# Patient Record
Sex: MALE | Race: WHITE | Employment: FULL TIME | URBAN - METROPOLITAN AREA
[De-identification: names, ages, dates, MRNs, and addresses within clinical notes are randomized per-mention and may not be internally consistent; named-entity substitution may affect disease eponyms.]

---

## 2021-06-07 ENCOUNTER — HOSPITAL ENCOUNTER (EMERGENCY)
Age: 51
Discharge: HOME | End: 2021-06-07
Attending: EMERGENCY MEDICINE

## 2021-06-07 ENCOUNTER — APPOINTMENT (OUTPATIENT)
Dept: GENERAL RADIOLOGY | Age: 51
End: 2021-06-07

## 2021-06-07 VITALS
RESPIRATION RATE: 18 BRPM | HEART RATE: 76 BPM | SYSTOLIC BLOOD PRESSURE: 166 MMHG | OXYGEN SATURATION: 98 % | DIASTOLIC BLOOD PRESSURE: 80 MMHG | BODY MASS INDEX: 25.73 KG/M2 | WEIGHT: 190 LBS | TEMPERATURE: 98.5 F | HEIGHT: 72 IN
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
## 2021-06-07 DIAGNOSIS — S69.92XA INJURY OF FINGER OF LEFT HAND BY NAIL GUN, INITIAL ENCOUNTER: Primary | ICD-10-CM

## 2021-06-07 DIAGNOSIS — S62.650A CLOSED NONDISPLACED FRACTURE OF MIDDLE PHALANX OF RIGHT INDEX FINGER, INITIAL ENCOUNTER: ICD-10-CM

## 2021-06-07 PROCEDURE — 99283 EMERGENCY DEPT VISIT LOW MDM: CPT

## 2021-06-07 PROCEDURE — 90471 IMMUNIZATION ADMIN: CPT | Performed by: EMERGENCY MEDICINE

## 2021-06-07 PROCEDURE — 6370000000 HC RX 637 (ALT 250 FOR IP): Performed by: EMERGENCY MEDICINE

## 2021-06-07 PROCEDURE — 10120 INC&RMVL FB SUBQ TISS SMPL: CPT

## 2021-06-07 PROCEDURE — 6360000002 HC RX W HCPCS: Performed by: EMERGENCY MEDICINE

## 2021-06-07 PROCEDURE — 73130 X-RAY EXAM OF HAND: CPT

## 2021-06-07 PROCEDURE — 64450 NJX AA&/STRD OTHER PN/BRANCH: CPT

## 2021-06-07 PROCEDURE — 90715 TDAP VACCINE 7 YRS/> IM: CPT | Performed by: EMERGENCY MEDICINE

## 2021-06-07 RX ORDER — FENTANYL CITRATE 50 UG/ML
75 INJECTION, SOLUTION INTRAMUSCULAR; INTRAVENOUS ONCE
Status: DISCONTINUED | OUTPATIENT
Start: 2021-06-07 | End: 2021-06-07 | Stop reason: HOSPADM

## 2021-06-07 RX ORDER — HYDROCODONE BITARTRATE AND ACETAMINOPHEN 5; 325 MG/1; MG/1
1 TABLET ORAL EVERY 6 HOURS PRN
Qty: 10 TABLET | Refills: 0 | Status: SHIPPED | OUTPATIENT
Start: 2021-06-07 | End: 2021-06-10

## 2021-06-07 RX ORDER — AMOXICILLIN AND CLAVULANATE POTASSIUM 875; 125 MG/1; MG/1
1 TABLET, FILM COATED ORAL 2 TIMES DAILY
Qty: 20 TABLET | Refills: 0 | Status: SHIPPED | OUTPATIENT
Start: 2021-06-07 | End: 2021-06-17

## 2021-06-07 RX ORDER — HYDROCODONE BITARTRATE AND ACETAMINOPHEN 5; 325 MG/1; MG/1
1 TABLET ORAL ONCE
Status: COMPLETED | OUTPATIENT
Start: 2021-06-07 | End: 2021-06-07

## 2021-06-07 RX ORDER — DIAPER,BRIEF,INFANT-TODD,DISP
EACH MISCELLANEOUS ONCE
Status: COMPLETED | OUTPATIENT
Start: 2021-06-07 | End: 2021-06-07

## 2021-06-07 RX ADMIN — Medication: at 13:16

## 2021-06-07 RX ADMIN — HYDROCODONE BITARTRATE AND ACETAMINOPHEN 1 TABLET: 5; 325 TABLET ORAL at 11:06

## 2021-06-07 RX ADMIN — TETANUS TOXOID, REDUCED DIPHTHERIA TOXOID AND ACELLULAR PERTUSSIS VACCINE, ADSORBED 0.5 ML: 5; 2.5; 8; 8; 2.5 SUSPENSION INTRAMUSCULAR at 10:54

## 2021-06-07 ASSESSMENT — PAIN SCALES - GENERAL
PAINLEVEL_OUTOF10: 8
PAINLEVEL_OUTOF10: 8

## 2021-06-07 ASSESSMENT — PAIN DESCRIPTION - FREQUENCY: FREQUENCY: CONTINUOUS

## 2021-06-07 ASSESSMENT — PAIN DESCRIPTION - PROGRESSION: CLINICAL_PROGRESSION: GRADUALLY WORSENING

## 2021-06-07 ASSESSMENT — PAIN DESCRIPTION - ORIENTATION: ORIENTATION: LEFT

## 2021-06-07 ASSESSMENT — PAIN DESCRIPTION - ONSET: ONSET: SUDDEN

## 2021-06-07 ASSESSMENT — PAIN DESCRIPTION - PAIN TYPE: TYPE: ACUTE PAIN

## 2021-06-07 ASSESSMENT — PAIN DESCRIPTION - LOCATION: LOCATION: OTHER (COMMENT)

## 2021-06-07 NOTE — ED PROVIDER NOTES
This is a 54-year male with a past medical history of diabetes mellitus who presents to the ED for evaluation of a finger injury. Patient states that about 20 minutes prior to arrival he was driving home had a nail gun went to check it and shot his finger of his middle digit on his left hand with a nail gun. Patient states he developed immediate pain and some bleeding to the area. Patient denies any other injury he is unsure whether his tetanus is up-to-date. Patient has no fevers or chills. Patient is right-handed. The history is provided by the patient. No  was used. Review of Systems   Constitutional: Negative for fever. Skin: Positive for wound. Allergic/Immunologic: Negative for immunocompromised state. Hematological: Does not bruise/bleed easily. All other systems reviewed and are negative. Physical Exam  Vitals and nursing note reviewed. Constitutional:       General: He is not in acute distress. Appearance: He is well-developed. He is not ill-appearing. HENT:      Head: Normocephalic and atraumatic. Mouth/Throat:      Mouth: Mucous membranes are moist.   Eyes:      Extraocular Movements: Extraocular movements intact. Neck:      Vascular: No JVD. Cardiovascular:      Rate and Rhythm: Normal rate and regular rhythm. Heart sounds: No murmur heard. Pulmonary:      Effort: Pulmonary effort is normal.   Abdominal:      General: There is no distension. Palpations: Abdomen is soft. Tenderness: There is no abdominal tenderness. There is no guarding or rebound. Hernia: No hernia is present. Musculoskeletal:      Cervical back: Normal range of motion and neck supple. Right lower leg: No edema. Left lower leg: No edema. Skin:     General: Skin is warm and dry. Capillary Refill: Capillary refill takes less than 2 seconds.       Comments: Nail through middle aspect of third phalanx entirely through with minimal Bianca Moore DO             ED Course as of Jun 07 2112 Mon Jun 07, 2021   1229 Repaging orthopedics    [BP]      ED Course User Index  [BP] Bianca Moore DO       --------------------------------------------- PAST HISTORY ---------------------------------------------  Past Medical History:  has a past medical history of Chronic back pain, Diabetes mellitus ([facility] [address] and [facility] Past Surgical History:  has a past surgical history that includes Chest surgery (2007); Chest surgery; and fracture surgery. Social History:  reports that he has been smoking cigarettes. He has been smoking about 1.00 pack per day. He has never used smokeless tobacco. He reports current alcohol use. He reports current drug use. Drug: Marijuana. Family History: family history is not on file. The patients home medications have been reviewed. Allergies: Opium    -------------------------------------------------- RESULTS -------------------------------------------------  Labs:  No results found for this visit on 06/07/21. Radiology:  XR HAND LEFT (MIN 3 VIEWS)   Final Result   Metallic nail imbedded in the 3rd middle phalanx with associated fracture   deformity.             ------------------------- NURSING NOTES AND VITALS REVIEWED ---------------------------  Date / Time Roomed:  6/7/2021 10:37 AM  ED Bed Assignment:  02/02    The nursing notes within the ED encounter and vital signs as below have been reviewed. BP (!) 166/80   Pulse 76   Temp 98.5 °F (36.9 °C) (Temporal)   Resp 18   Ht 6' (1.829 m)   Wt 190 lb (86.2 kg)   SpO2 98%   BMI 25.77 kg/m²   Oxygen Saturation Interpretation: Normal      ------------------------------------------ PROGRESS NOTES ------------------------------------------  1:55 PM EDT  I have spoken with the patient and discussed todays results, in addition to providing specific details for the plan of care and counseling regarding the diagnosis and prognosis.   Their questions are answered at this time and they are agreeable with the plan. I discussed at length with them reasons for immediate return here for re evaluation. They will followup with orthopedics. --------------------------------- ADDITIONAL PROVIDER NOTES ---------------------------------  At this time the patient is without objective evidence of an acute process requiring hospitalization or inpatient management. They have remained hemodynamically stable throughout their entire ED visit and are stable for discharge with outpatient follow-up. The plan has been discussed in detail and they are aware of the specific conditions for emergent return, as well as the importance of follow-up. Discharge Medication List as of 6/7/2021  1:06 PM      START taking these medications    Details   amoxicillin-clavulanate (AUGMENTIN) 875-125 MG per tablet Take 1 tablet by mouth 2 times daily for 10 days, Disp-20 tablet, R-0Print      HYDROcodone-acetaminophen (NORCO) 5-325 MG per tablet Take 1 tablet by mouth every 6 hours as needed for Pain for up to 3 days. Intended supply: 3 days. Take lowest dose possible to manage pain, Disp-10 tablet, R-0Print             Diagnosis:  1. Injury of finger of left hand by nail gun, initial encounter    2. Closed nondisplaced fracture of middle phalanx of right index finger, initial encounter        Disposition:  Patient's disposition: Discharge to home  Patient's condition is stable.      Kareen Hou DO  06/07/21 1174

## 2022-10-12 ENCOUNTER — APPOINTMENT (OUTPATIENT)
Dept: GENERAL RADIOLOGY | Age: 52
End: 2022-10-12

## 2022-10-12 ENCOUNTER — HOSPITAL ENCOUNTER (EMERGENCY)
Age: 52
Discharge: HOME | End: 2022-10-12

## 2022-10-12 VITALS
WEIGHT: 180 LBS | BODY MASS INDEX: 24.38 KG/M2 | HEIGHT: 72 IN | HEART RATE: 93 BPM | SYSTOLIC BLOOD PRESSURE: 126 MMHG | OXYGEN SATURATION: 97 % | TEMPERATURE: 98 F | DIASTOLIC BLOOD PRESSURE: 72 MMHG | RESPIRATION RATE: 16 BRPM

## 2022-10-12 DIAGNOSIS — S93.401A SPRAIN OF RIGHT ANKLE, UNSPECIFIED LIGAMENT, INITIAL ENCOUNTER: ICD-10-CM

## 2022-10-12 DIAGNOSIS — M77.9 BONE SPUR: Primary | ICD-10-CM

## 2022-10-12 DIAGNOSIS — M72.2 PLANTAR FASCIITIS: ICD-10-CM

## 2022-10-12 PROCEDURE — 73610 X-RAY EXAM OF ANKLE: CPT

## 2022-10-12 PROCEDURE — 99283 EMERGENCY DEPT VISIT LOW MDM: CPT

## 2022-10-12 RX ORDER — IBUPROFEN 600 MG/1
600 TABLET ORAL 3 TIMES DAILY PRN
Qty: 30 TABLET | Refills: 0 | Status: SHIPPED | OUTPATIENT
Start: 2022-10-12

## 2022-10-12 ASSESSMENT — PAIN - FUNCTIONAL ASSESSMENT
PAIN_FUNCTIONAL_ASSESSMENT: ACTIVITIES ARE NOT PREVENTED
PAIN_FUNCTIONAL_ASSESSMENT: 0-10
PAIN_FUNCTIONAL_ASSESSMENT: 0-10

## 2022-10-12 ASSESSMENT — PAIN DESCRIPTION - DESCRIPTORS
DESCRIPTORS: ACHING
DESCRIPTORS: SHARP

## 2022-10-12 ASSESSMENT — PAIN DESCRIPTION - LOCATION
LOCATION: ANKLE
LOCATION: ANKLE

## 2022-10-12 ASSESSMENT — PAIN DESCRIPTION - ORIENTATION
ORIENTATION: RIGHT
ORIENTATION: RIGHT

## 2022-10-12 ASSESSMENT — PAIN DESCRIPTION - FREQUENCY
FREQUENCY: CONTINUOUS
FREQUENCY: CONTINUOUS

## 2022-10-12 ASSESSMENT — PAIN DESCRIPTION - PAIN TYPE: TYPE: ACUTE PAIN

## 2022-10-12 ASSESSMENT — PAIN DESCRIPTION - ONSET
ONSET: ON-GOING
ONSET: SUDDEN

## 2022-10-12 ASSESSMENT — PAIN SCALES - GENERAL
PAINLEVEL_OUTOF10: 8
PAINLEVEL_OUTOF10: 6

## 2022-10-12 NOTE — Clinical Note
Kasey Tai was seen and treated in our emergency department on 10/12/2022. He may return to work on 10/14/2022. If you have any questions or concerns, please don't hesitate to call.       Sachin Lau, APRN - CNP

## 2022-10-12 NOTE — ED NOTES
Department of Emergency Medicine  FIRST PROVIDER TRIAGE NOTE             Independent MLP           10/12/22  6:47 PM EDT    Date of Encounter: 10/12/22   MRN: [id number]      HPI: Maya Watson is a 50 y.o. male who presents to the ED for Ankle Pain (Right ankle pain. Thinking arthritis flare up. /)       ROS: Negative for cp or sob. PE: Gen Appearance/Constitutional: alert  HEENT: NC/NT. PERRLA,  Airway patent. Initial Plan of Care: All treatment areas with department are currently occupied. Plan to order/Initiate the following while awaiting opening in ED: imaging studies.   Initiate Treatment-Testing, Proceed toTreatment Area When Bed Available for ED Attending/MLP to Continue Care    Electronically signed by GINETTE Jenkins CNP   DD: 10/12/22      GINETTE Snider CNP  10/12/22 [id number]

## 2022-10-12 NOTE — Clinical Note
Ben Coles was seen and treated in our emergency department on 10/12/2022. He may return to work on 10/14/2022. If you have any questions or concerns, please don't hesitate to call.       Dayan Hadley, GINETTE - CNP

## 2022-10-12 NOTE — Clinical Note
Natalya Santizo was seen and treated in our emergency department on 10/12/2022. He may return to work on 10/14/2022. If you have any questions or concerns, please don't hesitate to call.       Brad Shirleyelor, APRN - CNP

## 2022-10-13 NOTE — ED PROVIDER NOTES
[address]  Department of Emergency Medicine   ED  Encounter Note  Admit Date/RoomTime: 10/12/2022  7:59 PM  ED Room:     NAME: Sandy Graham  : 1970  MRN: [id number]     Chief Complaint:  Ankle Pain (Right ankle pain. Thinking arthritis flare up. /)    History of Present Illness         Sandy Graham is a 50 y.o. old male presenting to the emergency department by private vehicle, for traumatic Right ankle pain which occured 18  year(s) prior to arrival.  The complaint is due to a remote accident where he shot his right ankle with a nail gun. Patient states that he was seen and evaluated for that injury 18 years ago but states he has persistent and recurrent pain in the ankle and the foot for the last 18 years. Patient denies any new injury or trauma at this time he also states that he has some tenderness when he steps out of bed first thing in the morning to the plantar surface of his foot. 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 He does wear steel toed shoes. Patient denies any numbness tingling or weakness. Patient denies any loss of sensation. He denies any new injury or trauma to the bilateral feet or ankles. .  Since onset the symptoms have been intermittent and waxing and waning with ability to bear weight, but with some pain. Patient has no prior history of pain/injury with regards to today's visit. His pain is aggraveated by certain movements or pressure on or palpation of painful area and relieved by rest of injured area. He denies any head injury, headache, loss of consciousness, confusion, dizziness, numbness, or weakness. Patient states that he needs a work note as he left work today. Tetanus Status: up to date. ROS   Pertinent positives and negatives are stated within HPI, all other systems reviewed and are negative.     Past Medical History:  has a past medical history of Chronic back pain, Diabetes mellitus ([facility] [address] and Seizures ([facility] [address] Surgical History:  has a past surgical history that includes Chest surgery (2007); Chest surgery; and fracture surgery. Social History:  reports that he has been smoking cigarettes. He has been smoking an average of 1 pack per day. He has never used smokeless tobacco. He reports current alcohol use. He reports current drug use. Drug: Marijuana Jenet Kathryn). Family History: family history is not on file. Allergies: Opium    Physical Exam   Oxygen Saturation Interpretation: Normal.        ED Triage Vitals   BP Temp Temp Source Heart Rate Resp SpO2 Height Weight   10/12/22 1831 10/12/22 1831 10/12/22 1831 10/12/22 1831 10/12/22 1831 10/12/22 1831 10/12/22 1846 10/12/22 1846   126/72 98 °F (36.7 °C) Oral 93 16 97 % 6' (1.829 m) 180 lb (81.6 kg)       Physical Exam  Constitutional:  Alert, development consistent with age. Neck:  Normal ROM. Supple. Right Ankle: diffusely across ankle              Tenderness:  mild. Swelling: None. Deformity: no deformity observed/palpated. ROM: full range of motion. Skin:  no wounds, erythema, or swelling. Neurovascular: Motor deficit: none. Sensory deficit:   none. Pulse deficit: none. Capillary refill: normal.  Right Knee:              Tenderness:  none. Swelling: None. Deformity: no deformity observed/palpated. ROM: full range of motion. Skin:  no wounds, erythema, or swelling. Right Foot: plantar surface              Tenderness:  mild. Swelling: none. Deformity: no deformity observed/palpated. ROM: full range of motion. Skin:  tenderness  Gait:  normal.   Lymphatics: No lymphangitis or adenopathy noted. Neurological:  Oriented. Motor functions intact.     Lab / Imaging Results   (All laboratory and radiology results have been personally reviewed by myself)  Labs:  No results found for this visit on 10/12/22. Imaging: All Radiology results interpreted by Radiologist unless otherwise noted. XR ANKLE RIGHT (MIN 3 VIEWS)   Final Result   No evidence of fracture or subluxation. Small calcaneal spurs as described. ED Course / Medical Decision Making   Medications - No data to display     Consults:   None    Procedure(s):  None    MDM:      Imaging was obtained based on moderate suspicion for fracture / bony abnormality as per history/physical findings. Plan is subsequently for symptom control, limited use and immobilization with outpatient follow-up with pcp as instructed in d/c instructions and with podiatrist as instructed in d/c instructions. to follow-up with orthopedics for his recurrent symptoms. Patient does have an incidental finding of a bone spur on his x-ray. Patient also upon assessment of signs consistent with plantars fasciitis placed in a postop shoe educated on exercises anti-inflammatory medication ice and rest.  All questions were answered. Patient nontoxic in appearance and in no distress and stable for close outpatient follow-up. At this time the patient is without objective evidence of an acute process requiring hospitalization or inpatient management. They have remained hemodynamically stable throughout their entire ED visit and are stable for discharge with outpatient follow-up. The plan has been discussed in detail and they are aware of the specific conditions for emergent return, as well as the importance of follow-up. Plan of Care/Counseling:  GINETTE Morris CNP reviewed today's visit with the patient in addition to providing specific details for the plan of care and counseling regarding the diagnosis and prognosis. Questions are answered at this time and are agreeable with the plan. Assessment      1. Bone spur    2. Plantar fasciitis    3.  Sprain of right ankle, unspecified ligament, initial encounter      Plan   Discharged home. Patient condition is good    New Medications     Discharge Medication List as of 10/12/2022  8:57 PM        START taking these medications    Details   ibuprofen (ADVIL;MOTRIN) 600 MG tablet Take 1 tablet by mouth 3 times daily as needed for Pain, Disp-30 tablet, R-0Normal           Electronically signed by GINETTE Kim CNP   DD: 10/13/22  **This report was transcribed using voice recognition software. Every effort was made to ensure accuracy; however, inadvertent computerized transcription errors may be present.   END OF ED PROVIDER NOTE       GINETTE Grace CNP  10/13/22 [id number]